# Patient Record
Sex: FEMALE | Race: WHITE
[De-identification: names, ages, dates, MRNs, and addresses within clinical notes are randomized per-mention and may not be internally consistent; named-entity substitution may affect disease eponyms.]

---

## 2017-01-29 ENCOUNTER — HOSPITAL ENCOUNTER (EMERGENCY)
Dept: HOSPITAL 62 - ER | Age: 72
Discharge: HOME | End: 2017-01-29
Payer: MEDICARE

## 2017-01-29 VITALS — SYSTOLIC BLOOD PRESSURE: 126 MMHG | DIASTOLIC BLOOD PRESSURE: 52 MMHG

## 2017-01-29 DIAGNOSIS — Z90.710: ICD-10-CM

## 2017-01-29 DIAGNOSIS — F17.200: ICD-10-CM

## 2017-01-29 DIAGNOSIS — I25.2: ICD-10-CM

## 2017-01-29 DIAGNOSIS — E78.00: ICD-10-CM

## 2017-01-29 DIAGNOSIS — I10: Primary | ICD-10-CM

## 2017-01-29 DIAGNOSIS — Z90.11: ICD-10-CM

## 2017-01-29 PROCEDURE — 99283 EMERGENCY DEPT VISIT LOW MDM: CPT

## 2017-01-29 NOTE — ER DOCUMENT REPORT
ED Blood Pressure Problem





- General


Chief Complaint: High Blood Pressure


Stated Complaint: BLOOD PRESSURE ISSUE


Time seen by provider: 23:17


Mode of Arrival: Ambulatory


Information source: Patient


TRAVEL OUTSIDE OF THE [place] IN LAST 30 DAYS: No





- HPI


Patient complains to provider of: High blood pressure


Onset: Just prior to arrival


Onset/Duration: Sudden


Quality of pain: No pain


Severity: None


Pain Level: Denies


Pt currently taking medication for problem: Yes


Associated symptoms: None


Similar symptoms previously: Yes


Recently seen / treated by doctor: No


Notes: 


Patient is a 71-year-old female with a history of high blood pressure, previous 

MI, who presents to the emergency room complaining of elevated blood pressure 

this evening which she measured right before going to bed, states she does this 

approximately once a week since having her heart attack back in  or 2016, she denies a headache, no vision changes, no chest pain, no shortness of 

breath, no recent illness or injury, she actually has no complaints, states 

that while she was on her way here she realized that over the past 2 days she 

had an increased salt intake which is likely the cause of her elevated blood 

pressure





- Related Data


Allergies/Adverse Reactions: 


 





No Known Allergies Allergy (Unverified 12 15:22)


 











Past Medical History





- General


Information source: Patient





- Social History


Smoking Status: Current Every Day Smoker


Chew tobacco use (# tins/day): No


Frequency of alcohol use: None


Drug Abuse: None


Family History: Reviewed & Not Pertinent


Patient has suicidal ideation: No


Patient has homicidal ideation: No





- Past Medical History


Cardiac Medical History: Reports: Hx Hypercholesterolemia, Hx Hypertension


   Denies: Hx Heart Attack


Pulmonary Medical History: 


   Denies: Hx Asthma


Neurological Medical History: Denies: Hx Cerebrovascular Accident, Hx Seizures


Renal/ Medical History: Denies: Hx Peritoneal Dialysis


GI Medical History: Denies: Hx Hepatitis, Hx Hiatal Hernia, Hx Ulcer


Infectious Medical History: Denies: Hx Hepatitis


Past Surgical History: Reports: Hx Appendectomy, Hx  Section, Hx 

Hysterectomy, Hx Mastectomy - AVOID RIGHT ARM.  Denies: Hx Open Heart Surgery, 

Hx Pacemaker





- Immunizations


Hx Diphtheria, Pertussis, Tetanus Vaccination: Yes - recv'd tetanus in triage





Review of Systems





- Review of Systems


Constitutional: No symptoms reported


EENT: No symptoms reported


Cardiovascular: No symptoms reported


Respiratory: No symptoms reported


Gastrointestinal: No symptoms reported


Genitourinary: No symptoms reported


Female Genitourinary: No symptoms reported


Musculoskeletal: No symptoms reported


Skin: No symptoms reported


Hematologic/Lymphatic: No symptoms reported


Neurological/Psychological: No symptoms reported


-: Yes All other systems reviewed and negative





Physical Exam





- Vital signs


Vitals: 


 











Temp Pulse Resp BP Pulse Ox


 


 98.3 F   94   16   190/87 H  97 


 


 17 21:20  17 21:20  17 21:20  17 21:20  17 21:20











Interpretation: Hypertensive





- General


General appearance: Appears well, Alert





- HEENT


Head: Normocephalic, Atraumatic


Eyes: Normal


Pupils: PERRL





- Respiratory


Respiratory status: No respiratory distress


Chest status: Nontender


Breath sounds: Normal


Chest palpation: Normal





- Cardiovascular


Rhythm: Regular


Heart sounds: Normal auscultation


Murmur: No





- Abdominal


Inspection: Normal


Distension: No distension


Bowel sounds: Normal


Tenderness: Nontender


Organomegaly: No organomegaly





- Back


Back: Normal, Nontender





- Extremities


General upper extremity: Normal inspection, Nontender, Normal color, Normal ROM

, Normal temperature


General lower extremity: Normal inspection, Nontender, Normal color, Normal ROM

, Normal temperature, Normal weight bearing.  No: Ray's sign





- Neurological


Neuro grossly intact: Yes


Cognition: Normal


Orientation: AAOx4


[place] Coma Scale Eye Opening: Spontaneous


[place] Coma Scale Verbal: Oriented


[place] Coma Scale Motor: Obeys Commands


[place] Coma Scale Total: 15


Speech: Normal


Motor strength normal: LUE, RUE, LLE, RLE


Sensory: Normal





- Psychological


Associated symptoms: Normal affect, Normal mood





- Skin


Skin Temperature: Warm


Skin Moisture: Dry


Skin Color: Normal





Course





- Re-evaluation


Re-evalutation: 





17 00:35


Patient with asymptomatic elevation in her blood pressure, she takes metoprolol 

25 mg twice a day, dates that over the past 2 days she's had increased salt 

intake through her diet, she was advised to drink plenty of fluids over the 

next few days, decrease her salt intake once again, follow-up with her primary 

care provider in 2-3 days for repeat blood pressure check or return to the 

emergency room immediately if symptoms worsen in any way, patient acknowledges 

understanding and agreement with this plan





- Vital Signs


Vital signs: 


 











Temp Pulse Resp BP Pulse Ox


 


 97.6 F   67   16   126/52 H  96 


 


 17 23:22  17 23:22  17 23:22  17 23:22  17 23:22














Discharge





- Discharge


Clinical Impression: 


High blood pressure


Qualifiers:


 Hypertension type: essential hypertension Qualified Code(s): I10 - Essential (

primary) hypertension





Condition: Stable


Disposition: HOME, SELF-CARE


Instructions:  High Blood Pressure ([facility])


Additional Instructions: 


Follow up with your primary care provider in one to 2 days.  Return to the 

emergency room immediately if symptoms worsen or any additional concerns.  

Maintain a healthy low-salt diet.


Referrals: 


GAVIN CARNES MD [Primary Care Provider] - Follow up as needed

## 2017-01-29 NOTE — ER DOCUMENT REPORT
ED Medical Screen (RME)





- General


Stated Complaint: BLOOD PRESSURE ISSUE


Notes: 


High blood pressure this evening prior to going to bed.





I greeted and performed a rapid initial assessment of this patient. 

Comprehensive ED assessment and evaluation of the patient, analysis of test 

results and completion of the medical decision making process will be conducted 

by additional ED providers.


TRAVEL OUTSIDE OF THE [place] IN LAST 30 DAYS: No





- Related Data


Allergies/Adverse Reactions: 


 





No Known Allergies Allergy (Unverified 12 15:22)


 











Past Medical History





- Past Medical History


Cardiac Medical History: Reports: Hx Hypercholesterolemia, Hx Hypertension


   Denies: Hx Heart Attack


Pulmonary Medical History: 


   Denies: Hx Asthma


Neurological Medical History: Denies: Hx Cerebrovascular Accident, Hx Seizures


GI Medical History: Denies: Hx Hepatitis, Hx Hiatal Hernia, Hx Ulcer


Infectious Medical History: Denies: Hx Hepatitis


Past Surgical History: Reports: Hx Appendectomy, Hx  Section, Hx 

Hysterectomy, Hx Mastectomy - AVOID RIGHT ARM.  Denies: Hx Open Heart Surgery, 

Hx Pacemaker





- Immunizations


Hx Diphtheria, Pertussis, Tetanus Vaccination: Yes - recv'd tetanus in triage

## 2017-01-31 ENCOUNTER — HOSPITAL ENCOUNTER (EMERGENCY)
Dept: HOSPITAL 62 - ER | Age: 72
LOS: 1 days | Discharge: HOME | End: 2017-02-01
Payer: MEDICARE

## 2017-01-31 DIAGNOSIS — I25.10: ICD-10-CM

## 2017-01-31 DIAGNOSIS — I10: Primary | ICD-10-CM

## 2017-01-31 DIAGNOSIS — Z79.899: ICD-10-CM

## 2017-01-31 PROCEDURE — 99283 EMERGENCY DEPT VISIT LOW MDM: CPT

## 2017-02-01 VITALS — DIASTOLIC BLOOD PRESSURE: 55 MMHG | SYSTOLIC BLOOD PRESSURE: 134 MMHG

## 2017-02-01 NOTE — ER DOCUMENT REPORT
ED General





- General


Chief Complaint: High Blood Pressure


Stated Complaint: BLOOD PRESSURE PROBLEMS


Notes: 


Patient is a 71-year-old female past medical history of coronary artery disease 

and hypertension who presents with concerns of elevated blood pressure.  

Patient states she took her blood pressures night before going to sleep and 

noticed that it was into the 180s systolic and became very concerned.  She was 

seen in the emergency room recently for similar concern.  States she's been 

taking her metoprolol as directed but had not taken it before stating to take 

her blood pressure tonight.  She denies any additional symptoms.  She has not 

noted that anything seems to worsen her blood pressure and notes that when she 

takes her metoprolol the blood pressure is somewhat lower.  She specifically 

denies any chest pain, shortness of breath or headache.


TRAVEL OUTSIDE OF THE [place] IN LAST 30 DAYS: No





- Related Data


Allergies/Adverse Reactions: 


 





No Known Allergies Allergy (Unverified 12 15:22)


 











Past Medical History





- General


Information source: Patient





- Social History


Smoking Status: Current Every Day Smoker


Chew tobacco use (# tins/day):  - e. cig


Frequency of alcohol use: None


Drug Abuse: None


Lives with: Family


Family History: Reviewed & Not Pertinent


Patient has suicidal ideation: No


Patient has homicidal ideation: No





- Past Medical History


Cardiac Medical History: Reports: Hx Hypercholesterolemia, Hx Hypertension


   Denies: Hx Heart Attack


Pulmonary Medical History: 


   Denies: Hx Asthma


Neurological Medical History: Denies: Hx Cerebrovascular Accident, Hx Seizures


Renal/ Medical History: Denies: Hx Peritoneal Dialysis


GI Medical History: Denies: Hx Hepatitis, Hx Hiatal Hernia, Hx Ulcer


Infectious Medical History: Denies: Hx Hepatitis


Past Surgical History: Reports: Hx Appendectomy, Hx  Section, Hx 

Hysterectomy, Hx Mastectomy - AVOID RIGHT ARM.  Denies: Hx Open Heart Surgery, 

Hx Pacemaker





- Immunizations


Hx Diphtheria, Pertussis, Tetanus Vaccination: Yes - recv'd tetanus in triage





Review of Systems





- Review of Systems


Notes: 


Constitutional: Negative for fever.


HENT: Negative for sore throat.


Eyes: Negative for visual changes.


Cardiovascular: Negative for chest pain.


Respiratory: Negative for shortness of breath.


Gastrointestinal: Negative for abdominal pain, vomiting or diarrhea.


Genitourinary: Negative for dysuria.


Musculoskeletal: Negative for back pain.


Skin: Negative for rash.


Neurological: Negative for headaches, weakness or numbness.





10 point ROS negative except as marked above and in HPI.





Physical Exam





- Vital signs


Vitals: 


 











Temp Pulse Resp BP Pulse Ox


 


 98.1 F   93   20   175/85 H  97 


 


 17 23:43  17 23:43  17 23:43  17 23:43  17 23:43











Interpretation: Hypertensive


Notes: 


PHYSICAL EXAMINATION:





GENERAL: Well-appearing, well-nourished and in no acute distress.





HEAD: Atraumatic, normocephalic.





EYES: Pupils equal round and reactive to light, extraocular movements intact, 

sclera anicteric, conjunctiva are normal.





ENT: nares patent, oropharynx clear without exudates.  Moist mucous membranes.





NECK: Normal range of motion, supple without lymphadenopathy





LUNGS: Breath sounds clear to auscultation bilaterally and equal.  No wheezes 

rales or rhonchi.





HEART: Regular rate and rhythm without murmurs





ABDOMEN: Soft, nontender, normoactive bowel sounds.  No guarding, no rebound.  

No masses appreciated.





EXTREMITIES: Normal range of motion, no pitting or edema.  No cyanosis.





NEUROLOGICAL: No focal neurological deficits. Moves all extremities 

spontaneously and on command.





PSYCH: Normal mood, normal affect.





SKIN: Warm, Dry, normal turgor, no rashes or lesions noted.





Course





- Re-evaluation


Re-evalutation: 





17 00:47


Presentation of asymptomatic hypertension. Patient denies any symptoms 

concerning for SAH, dissection, MI, or encephalopaty. Alert, oriented, and 

denies any symptoms at time of assessment. Normal neuro exam. Per [facility] policy 

guidelines, will therefore not obtain any labs or EKG at this time. I have 

discussed critical importance of follow up with PCP within 1 week and increased 

risk of devastating stroke, heart attack, respiratory distress, and other life 

threatening complications if blood pressure is not reduced appropriately. Diet 

and exercise habits also discussed. At this time will discharge with return 

precautions and follow-up recommendations.  Verbal discharge instructions given 

a the bedside and opportunity for questions given. Medication warnings 

reviewed. Patient is in agreement with this plan and has verbalized 

understanding of return precautions and the need for primary care follow-up in 

the next 24-72 hours.





- Vital Signs


Vital signs: 


 











Temp Pulse Resp BP Pulse Ox


 


 98.1 F   93   20   159/106 H  97 


 


 17 23:43  17 23:43  17 23:43  17 00:26  17 23:43














Discharge





- Discharge


Clinical Impression: 


 Essential hypertension





Condition: Good


Disposition: HOME, SELF-CARE


Additional Instructions: 


You were seen today  for blood pressure that was high.  This is a long-term 

risk factor for multiple medical problems including heart attack and stroke.  

However, the blood pressure in of itself will not cause you to have an acute 

stroke or heart attack over the course of just several days or weeks.  You need 

to have a gradual reduction of your blood pressure back to normal levels over 

the next several months in conjunction with your primary care physician.   

Return if you develop headache, weakness, numbness, chest pain, pass out, or 

have any other symptoms that are concerning to you. Start taking the HCTZ that 

was prescribed today in addition to the metoprolol that you already take


Prescriptions: 


Hydrochlorothiazide 12.5 mg PO DAILY #30 tablet


Referrals: 


GAVIN CARNES MD [Primary Care Provider] - Follow up as needed

## 2019-12-18 LAB
ANION GAP SERPL CALC-SCNC: 9 MMOL/L (ref 5–19)
APTT BLD: 26 SEC (ref 23.5–35.8)
BUN SERPL-MCNC: 20 MG/DL (ref 7–20)
CALCIUM: 10.1 MG/DL (ref 8.4–10.2)
CHLORIDE SERPL-SCNC: 104 MMOL/L (ref 98–107)
CO2 SERPL-SCNC: 29 MMOL/L (ref 22–30)
ERYTHROCYTE [DISTWIDTH] IN BLOOD BY AUTOMATED COUNT: 13.2 % (ref 11.5–14)
GLUCOSE SERPL-MCNC: 101 MG/DL (ref 75–110)
HCT VFR BLD CALC: 44.4 % (ref 36–47)
HGB BLD-MCNC: 15.2 G/DL (ref 12–15.5)
INR PPP: 0.97
MCH RBC QN AUTO: 31.5 PG (ref 27–33.4)
MCHC RBC AUTO-ENTMCNC: 34.1 G/DL (ref 32–36)
MCV RBC AUTO: 92 FL (ref 80–97)
PLATELET # BLD: 155 10^3/UL (ref 150–450)
POTASSIUM SERPL-SCNC: 4.2 MMOL/L (ref 3.6–5)
PROTHROMBIN TIME: 12.8 SEC (ref 11.4–15.4)
RBC # BLD AUTO: 4.8 10^6/UL (ref 3.72–5.28)
WBC # BLD AUTO: 6.7 10^3/UL (ref 4–10.5)

## 2019-12-18 NOTE — EKG REPORT
SEVERITY:- ABNORMAL ECG -

SINUS RHYTHM

RIGHT BUNDLE BRANCH BLOCK

:

Confirmed by: Darinel Pickering 18-Dec-2019 21:32:43

## 2019-12-18 NOTE — RADIOLOGY REPORT (SQ)
EXAM DESCRIPTION:  CHEST PA/LATERAL



COMPLETED DATE/TIME:  12/18/2019 9:48 am



REASON FOR STUDY:  PRE-OP



COMPARISON:  AP view of the chest from 6/30/2016.



EXAM PARAMETERS:  NUMBER OF VIEWS: two views

TECHNIQUE: Digital Frontal and Lateral radiographic views of the chest acquired.

RADIATION DOSE: NA

LIMITATIONS: none



FINDINGS:  LUNGS AND PLEURA: The asymmetric opacity that projects over the left midlung is due to sup
erimposition of a breast prosthesis.  There is no consolidation, pleural effusion or pneumothorax.

MEDIASTINUM AND HILAR STRUCTURES: No mediastinal or hilar contour abnormality.

HEART AND VASCULAR STRUCTURES: The cardiac silhouette and pulmonary vasculature are within normal elena
its.

BONES: No acute findings.

HARDWARE: Surgical clips that project over the right axilla.

OTHER: No other finding.



IMPRESSION:  No acute cardiopulmonary process.



TECHNICAL DOCUMENTATION:  JOB ID:  [id number]

 2011 [employer]- All Rights Reserved



Reading location - IP/workstation name: [email or web address]

## 2020-01-14 ENCOUNTER — HOSPITAL ENCOUNTER (OUTPATIENT)
Dept: HOSPITAL 62 - OROUT | Age: 75
Discharge: HOME | End: 2020-01-14
Attending: PLASTIC SURGERY
Payer: MEDICARE

## 2020-01-14 VITALS — DIASTOLIC BLOOD PRESSURE: 60 MMHG | SYSTOLIC BLOOD PRESSURE: 128 MMHG

## 2020-01-14 DIAGNOSIS — I10: ICD-10-CM

## 2020-01-14 DIAGNOSIS — Z85.3: ICD-10-CM

## 2020-01-14 DIAGNOSIS — Z85.828: ICD-10-CM

## 2020-01-14 DIAGNOSIS — Z87.891: ICD-10-CM

## 2020-01-14 DIAGNOSIS — C44.519: Primary | ICD-10-CM

## 2020-01-14 DIAGNOSIS — Z79.899: ICD-10-CM

## 2020-01-14 DIAGNOSIS — Z79.82: ICD-10-CM

## 2020-01-14 DIAGNOSIS — Z79.01: ICD-10-CM

## 2020-01-14 PROCEDURE — 36415 COLL VENOUS BLD VENIPUNCTURE: CPT

## 2020-01-14 PROCEDURE — 00300 ANES ALL PX INTEG H/N/PTRUNK: CPT

## 2020-01-14 PROCEDURE — 85027 COMPLETE CBC AUTOMATED: CPT

## 2020-01-14 PROCEDURE — 14000 TIS TRNFR TRUNK 10 SQ CM/<: CPT

## 2020-01-14 PROCEDURE — 88305 TISSUE EXAM BY PATHOLOGIST: CPT

## 2020-01-14 PROCEDURE — 85610 PROTHROMBIN TIME: CPT

## 2020-01-14 PROCEDURE — 71046 X-RAY EXAM CHEST 2 VIEWS: CPT

## 2020-01-14 PROCEDURE — 88331 PATH CONSLTJ SURG 1 BLK 1SPC: CPT

## 2020-01-14 PROCEDURE — 93010 ELECTROCARDIOGRAM REPORT: CPT

## 2020-01-14 PROCEDURE — 85730 THROMBOPLASTIN TIME PARTIAL: CPT

## 2020-01-14 PROCEDURE — 80048 BASIC METABOLIC PNL TOTAL CA: CPT

## 2020-01-14 PROCEDURE — 93005 ELECTROCARDIOGRAM TRACING: CPT

## 2020-01-14 NOTE — DISCHARGE SUMMARY
Discharge Summary (SDC)





- Discharge


Final Diagnosis: 





Basal cell carcinoma of the left central back


Date of Surgery: 01/14/20


Condition: Good


Treatment or Instructions: 


 


 


 


 


 


 


 


 


 


Leave the top dressing on for 2 days, then removed.


 


Leave the steri-strip tapes on for 5 days, then removal.


 


Then cleaning wound with peroxide and apply Neosporin/bacitracin 3 times per 

day.


 


Antibiotics for 1 day, then discontinue.


 


Elevate operative area to decrease swelling.


 


Do not strain, or lift heavy objects.


 


Call for excessive bleeding, increased temperature of 101, uncontrolled pain, 

or excessive nausea or vomiting.


You may reach Dr. Santos through his office at [phone].


In the event of an emergency after hours, then contact Dr. Santos through [facility].


 


Return to the office for a postop check on Thursday.  The time will be scheduled

by the nursing staff  of [facility] prior to discharge.


 


 


Please give the patient a copy of their labs and EKG so they can bring this to 

their PMD.


Thank you


 


Portions of this note may be dictated using Dragon voice recognition software.


Occasional variations and spelling and vocabulary could be possible and are 

unintentional.


Additionally, there is a chance that some errors may not be caught or corrected.


Please notify the offer of any discrepancies noted or if any statements are 

unclear.


 


Referrals: 


DAMASO OSORIO PA-C [Primary Care Provider] - 


Discharge Diet: As Tolerated


Discharge Activity: No Lifting/Push/Pulling


Report the Following to Your Physician Immediately: Unusual Bleeding - To not 

bend or stretch. Do not stretch the back. No reaching.

## 2020-01-14 NOTE — OPERATIVE REPORT
Operative Report


DATE OF SURGERY: 01/14/20


PREOPERATIVE DIAGNOSIS: Basal cell carcinoma of the left central back with posi

tive margin


POSTOPERATIVE DIAGNOSIS: Same


OPERATION: Excision of basal cell carcinoma from the left central back with 

frozen section margin control and reconstruction with a rotation flap


SURGEON: KAYLEY GOULD


ANESTHESIA: LMAC


TISSUE REMOVED OR ALTERED: Basal cell carcinoma


COMPLICATIONS: 





None


ESTIMATED BLOOD LOSS: 3 cc


PROCEDURE: 





 Patient seen and was marked prior to being brought into the operating room.


Patient was brought into the operating room and placed on the operating room 

table in a sloppy lateral position.


Patient was then prepped with a Betadine scrub and Betadine solution and draped 

in a sterile and aseptic manner.


The area was then marked.


12 O'clock was marked towards the apex of the shoulder                          

               


3 O'clock was marked towards the midline


 6:00 was marked towards the feet


9:00 was marked towards the left side


The area was then anesthetized with 1% lidocaine with epinephrine and 

bicarbonate for its anesthetic and hemostatic effects.


The area was then excised and marked at 12:00.


The specimen was sent for frozen section.


The results came back that the deep and lateral margins were free.


We had considered a primary closure but this would go against the natural 

relaxed skin tension lines. 


A primary closure would be too tight and would have increased chance of 

dehiscence.


This will leave more of a scar so we decided to use a rotation flap 

reconstruction which would camouflage the scar better and take tension off of 

the closure so that would be less chances of complications.


We decided to use the rotation flap because this would allow us to bring skin 

into the area where there was minimal laxity.


We were able to rotate the skin where there was some extra laxity and use that 

for the reconstruction.  A direct primary closure would have been too tight as 

noted above and would cause complications.


Then we went ahead and outlined the flap and anesthetized it.


We then incised the flap and developed a flap maintaining the subdermal plexus.


Then we undermined 360 to allow for plate like scarring and minimize trap door 

deformity.


Throughout the case hemostasis was achieved with the bipolar.


We then sutured the flap into its new position using 3-0 Vicryl for the 

subcutaneous and deep dermis.


Skin was closed with a running subcuticular suture stitch using 3-0 PDS with 

knots being tied on the outside.


And 3-0 PDS suture was used for support and placed in the central area of the 

incision.


 We then applied tincture benzoin and Steri-Strips followed by a light pressure 

dressing.


Patient was then reversed from anesthesia and taken to the [facility] for recovery. 


The patient tolerated well.  


There were no complications.  


Lesion size was approximately 2 cm please see pathology for actual size.


 


Portions of this note may be dictated using Dragon voice recognition software.


Occasional variations and spelling and vocabulary could be possible and are 

unintentional.


Additionally, there is a chance that some errors may not be caught or corrected.


Please notify the author of any discrepancies noted or if any statements are 

unclear.





Subjective: No complaints


Objective:   Vital signs stable afebrile


                   No bleeding


                   Dressing intact


Assessment and plan:


                   Doing well.


                   Elevate the operative site.


                   Resume medications.  


                   Take antibiotics for 1 day


                   Follow-up Thursday


                   Full instructions were given to the patient and family and 

they understand


 


Portions of this note may be dictated using Dragon voice recognition software.


Occasional variations and spelling and vocabulary could be possible and are 

unintentional.


Additionally, there is a chance that some errors may not be caught or corrected.


Please notify the offer of any discrepancies noted or if any statements are 

unclear.

## 2020-02-11 ENCOUNTER — HOSPITAL ENCOUNTER (OUTPATIENT)
Dept: HOSPITAL 62 - OROUT | Age: 75
Discharge: HOME | End: 2020-02-11
Attending: PLASTIC SURGERY
Payer: MEDICARE

## 2020-02-11 VITALS — DIASTOLIC BLOOD PRESSURE: 59 MMHG | SYSTOLIC BLOOD PRESSURE: 128 MMHG

## 2020-02-11 DIAGNOSIS — L82.1: ICD-10-CM

## 2020-02-11 DIAGNOSIS — Z87.891: ICD-10-CM

## 2020-02-11 DIAGNOSIS — Z79.82: ICD-10-CM

## 2020-02-11 DIAGNOSIS — L57.8: ICD-10-CM

## 2020-02-11 DIAGNOSIS — L57.0: ICD-10-CM

## 2020-02-11 DIAGNOSIS — C44.729: Primary | ICD-10-CM

## 2020-02-11 DIAGNOSIS — I25.2: ICD-10-CM

## 2020-02-11 DIAGNOSIS — Z79.899: ICD-10-CM

## 2020-02-11 DIAGNOSIS — Z79.01: ICD-10-CM

## 2020-02-11 LAB
APTT BLD: 26.9 SEC (ref 23.5–35.8)
INR PPP: 1.01
PROTHROMBIN TIME: 13.4 SEC (ref 11.4–15.4)

## 2020-02-11 PROCEDURE — 85610 PROTHROMBIN TIME: CPT

## 2020-02-11 PROCEDURE — 36415 COLL VENOUS BLD VENIPUNCTURE: CPT

## 2020-02-11 PROCEDURE — 00400 ANES INTEGUMENTARY SYS NOS: CPT

## 2020-02-11 PROCEDURE — 14020 TIS TRNFR S/A/L 10 SQ CM/<: CPT

## 2020-02-11 PROCEDURE — 88305 TISSUE EXAM BY PATHOLOGIST: CPT

## 2020-02-11 PROCEDURE — 88331 PATH CONSLTJ SURG 1 BLK 1SPC: CPT

## 2020-02-11 PROCEDURE — 85730 THROMBOPLASTIN TIME PARTIAL: CPT

## 2020-02-11 NOTE — OPERATIVE REPORT
Operative Report


DATE OF SURGERY: 02/11/20


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 

with positive deep margin


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 

with a positive deep margin


OPERATION: Excision of squamous cell carcinoma from the left upper lateral calf 

with frozen section margin control and reconstruction with a rotation flap


SURGEON: KAYLEY GOULD


ANESTHESIA: LMAC


TISSUE REMOVED OR ALTERED: Squamous cell carcinoma


COMPLICATIONS: 





None


ESTIMATED BLOOD LOSS: Minimal


PROCEDURE: 





 Patient seen and was marked prior to being brought into the operating room.


Patient was brought into the operating room and placed on the operating room tab

le in a sloppy lateral position.


Patient was then prepped with a Betadine scrub and Betadine solution and draped 

in a sterile and aseptic manner.


The area was then marked.


12 O'clock was marked towards the knee                                          


3 O'clock was marked towards the posterior calf


 6:00 was marked towards the foot


9:00 was marked towards the pretibial area


The area was then anesthetized with 1% lidocaine with epinephrine and 

bicarbonate for its anesthetic and hemostatic effects.


The area was then excised and marked at 12:00.


The specimen was sent for frozen section.


The results came back that the deep and lateral margins were free.


We had considered a primary closure but this would go against the natural 

relaxed skin tension lines. 


A primary closure would be too tight and would have increased chance of 

dehiscence.


This will leave more of a scar so we decided to use a rotation flap 

reconstruction which would camouflage the scar better and take tension off of 

the closure so that would be less chances of complications.


By using a rotation flap we were able to bring the tissue where there was more 

laxity into the area of the defect to minimize a dehiscence.


A direct primary closure again would be too tight and therefore the rotation 

flap was used for the reconstruction.


Then we went ahead and outlined the flap and anesthetized it.


We then incised the flap and developed a flap maintaining the subdermal plexus.


Then we undermined 360 to allow for plate like scarring and minimize trap door 

deformity.


Throughout the case hemostasis was achieved with the bipolar.


We then sutured the flap into its new position using [ 4-0 Vicryl] for the 

subcutaneous and deep dermis.


Skin was closed with a [running subcuticular suture] stitch using [4-0 PDS] with

knots being tied on the outside.


And [4-0 PDS] suture was used for support and placed in the central area of the 

incision.


 We then applied tincture benzoin and Steri-Strips followed by a light pressure 

dressing.


Patient was then reversed from anesthesia and taken to the [facility] for recovery. 


The patient tolerated well.  


There were no complications.  


Lesion size was approximately 1.2 cm please see pathology for actual size.


 


Portions of this note may be dictated using Dragon voice recognition software.


Occasional variations and spelling and vocabulary could be possible and are 

unintentional.


Additionally, there is a chance that some errors may not be caught or corrected.


Please notify the author of any discrepancies noted or if any statements are 

unclear.





Subjective: No complaints


Objective:   Vital signs stable afebrile


                   No bleeding


                   Dressing intact


Assessment and plan:


                   Doing well.


                   Elevate the operative site.


                   Resume medications.  


                   Take antibiotics for 1 day


                   Follow-up Thursday


                   Full instructions were given to the patient and family and 

they understand


 


Portions of this note may be dictated using Dragon voice recognition software.


Occasional variations and spelling and vocabulary could be possible and are 

unintentional.


Additionally, there is a chance that some errors may not be caught or corrected.


Please notify the offer of any discrepancies noted or if any statements are 

unclear.